# Patient Record
Sex: FEMALE | Race: WHITE
[De-identification: names, ages, dates, MRNs, and addresses within clinical notes are randomized per-mention and may not be internally consistent; named-entity substitution may affect disease eponyms.]

---

## 2017-11-21 ENCOUNTER — CHARTING TRANS (OUTPATIENT)
Dept: OTHER | Age: 32
End: 2017-11-21

## 2018-11-02 VITALS
HEIGHT: 69 IN | OXYGEN SATURATION: 98 % | HEART RATE: 86 BPM | BODY MASS INDEX: 21.48 KG/M2 | DIASTOLIC BLOOD PRESSURE: 84 MMHG | RESPIRATION RATE: 16 BRPM | SYSTOLIC BLOOD PRESSURE: 118 MMHG | WEIGHT: 145 LBS | TEMPERATURE: 98.2 F

## 2020-11-02 ENCOUNTER — OFFICE VISIT (OUTPATIENT)
Dept: FAMILY MEDICINE CLINIC | Facility: CLINIC | Age: 35
End: 2020-11-02
Payer: COMMERCIAL

## 2020-11-02 VITALS — OXYGEN SATURATION: 99 % | HEART RATE: 104 BPM | TEMPERATURE: 98 F

## 2020-11-02 DIAGNOSIS — Z11.59 SCREENING FOR VIRAL DISEASE: Primary | ICD-10-CM

## 2020-11-02 PROCEDURE — 99202 OFFICE O/P NEW SF 15 MIN: CPT | Performed by: FAMILY MEDICINE

## 2020-11-03 NOTE — PROGRESS NOTES
CHIEF COMPLAINT:   Patient presents with:  Upper Respiratory Infection: fever x 1-2 days, low grade. no nasal sx or cough. HPI:   Karen Henry is a 28year old female presents to clinic with complaints of low grade fever x 2 days.  Pt also exp (primary encounter diagnosis)  fever    Plan:   [facility] tes[facility]- 2-[facility] day return, pt voiced understanding. Quarantine guidelines discussed.      Follow up with PCP or in ED in 3-5 days if not improving, condition worsens, or fever greater than o

## 2022-06-06 ENCOUNTER — HOSPITAL ENCOUNTER (OUTPATIENT)
Facility: HOSPITAL | Age: 37
Discharge: HOME | End: 2022-06-06
Attending: OBSTETRICS & GYNECOLOGY | Admitting: OBSTETRICS & GYNECOLOGY
Payer: COMMERCIAL

## 2022-06-06 VITALS
RESPIRATION RATE: 14 BRPM | DIASTOLIC BLOOD PRESSURE: 68 MMHG | SYSTOLIC BLOOD PRESSURE: 118 MMHG | WEIGHT: 170 LBS | TEMPERATURE: 97 F | HEART RATE: 83 BPM | BODY MASS INDEX: 24.61 KG/M2 | HEIGHT: 69.5 IN

## 2022-06-06 PROCEDURE — 99202 OFFICE O/P NEW SF 15 MIN: CPT

## 2022-06-06 RX ORDER — CHOLECALCIFEROL (VITAMIN D3) 25 MCG
1 TABLET,CHEWABLE ORAL DAILY
COMMUNITY

## 2022-06-06 RX ORDER — CETIRIZINE HYDROCHLORIDE 10 MG/1
10 TABLET ORAL DAILY
COMMUNITY

## 2022-06-06 RX ORDER — ALBUTEROL SULFATE 90 UG/1
AEROSOL, METERED RESPIRATORY (INHALATION) EVERY 6 HOURS PRN
COMMUNITY

## 2022-06-06 NOTE — PROGRESS NOTES
Pt is a 40year old female admitted to [facility]. Patient presents with:  Decreased Fetal Movement     Pt is  27w5d intra-uterine pregnancy. Admitted to triage 1 via ambulance stretcher accompanied by EMTs. History obtained, consents signed. Oriented to room, staff, and plan of care. Pt c/o no FM for the last hour. Reports drinking a bottle of wine beginning at 1900. Admits to occasional alcohol use (4 times) during the pregnancy. Receives [facility] out of [place], denies pregnancy complications. EFM tested and applied, FHTs tracing and audible in 140s. Abdomen soft and non-tender.

## 2022-06-06 NOTE — PROGRESS NOTES
Discharge instructions given and explained, pt verbalizes understanding and agrees. Aware she is to f/u with her regular physician ASAP. Pt ambulatory and discharged home accompanied by mother.

## 2022-06-06 NOTE — NST
Nonstress Test   Patient: Frida Shankar    Gestation: [id number]    NST:       Variability: (!) Minimal           Accelerations: No           Decelerations: None            Baseline: 145 BPM           Uterine Irritability: No           Contractions: Not present                                        Acoustic Stimulator: No           Nonstress Test Interpretation: Appropriate for gestational age           Nonstress Test Second Interpretation: Appropriate for gestational age                     Additional Comments     Pt consumed a bottle of wine before arrival to hospital, MD aware